# Patient Record
Sex: MALE | Race: ASIAN | NOT HISPANIC OR LATINO | Employment: UNEMPLOYED | URBAN - METROPOLITAN AREA
[De-identification: names, ages, dates, MRNs, and addresses within clinical notes are randomized per-mention and may not be internally consistent; named-entity substitution may affect disease eponyms.]

---

## 2021-01-03 ENCOUNTER — IMMUNIZATIONS (OUTPATIENT)
Dept: FAMILY MEDICINE CLINIC | Facility: HOSPITAL | Age: 26
End: 2021-01-03

## 2021-01-03 DIAGNOSIS — Z23 ENCOUNTER FOR IMMUNIZATION: ICD-10-CM

## 2021-01-03 PROCEDURE — 0011A SARS-COV-2 / COVID-19 MRNA VACCINE (MODERNA) 100 MCG: CPT

## 2021-01-03 PROCEDURE — 91301 SARS-COV-2 / COVID-19 MRNA VACCINE (MODERNA) 100 MCG: CPT

## 2021-01-29 ENCOUNTER — IMMUNIZATIONS (OUTPATIENT)
Dept: FAMILY MEDICINE CLINIC | Facility: HOSPITAL | Age: 26
End: 2021-01-29

## 2021-01-29 DIAGNOSIS — Z23 ENCOUNTER FOR IMMUNIZATION: Primary | ICD-10-CM

## 2021-01-29 PROCEDURE — 91301 SARS-COV-2 / COVID-19 MRNA VACCINE (MODERNA) 100 MCG: CPT

## 2021-01-29 PROCEDURE — 0012A SARS-COV-2 / COVID-19 MRNA VACCINE (MODERNA) 100 MCG: CPT

## 2021-07-15 ENCOUNTER — DOCUMENTATION (OUTPATIENT)
Dept: BEHAVIORAL/MENTAL HEALTH CLINIC | Facility: CLINIC | Age: 26
End: 2021-07-15

## 2021-07-15 ENCOUNTER — TELEPHONE (OUTPATIENT)
Dept: PSYCHIATRY | Facility: CLINIC | Age: 26
End: 2021-07-15

## 2021-07-15 PROCEDURE — NC001 PR NO CHARGE: Performed by: SOCIAL WORKER

## 2021-07-15 NOTE — TELEPHONE ENCOUNTER
Varsha Zamora no showed their appointment for today with Manohar Rodriguez LCSW  This is patient's first no showed appointment  Letter was mailed via regular mail to address on file

## 2021-07-15 NOTE — PROGRESS NOTES
No Call No Show Charge        Fredrick Hunter  no showed on 07/15/21  appointment  Staff will contact the patient to reschedule appointment         Treatment Plan not due at this session

## 2021-07-22 ENCOUNTER — TELEMEDICINE (OUTPATIENT)
Dept: BEHAVIORAL/MENTAL HEALTH CLINIC | Facility: CLINIC | Age: 26
End: 2021-07-22

## 2021-07-22 DIAGNOSIS — F41.1 GAD (GENERALIZED ANXIETY DISORDER): Primary | ICD-10-CM

## 2021-07-22 PROCEDURE — 90834 PSYTX W PT 45 MINUTES: CPT | Performed by: SOCIAL WORKER

## 2021-07-22 NOTE — PSYCH
Virtual Regular Visit    Verification of patient location:    Patient is currently located in the state [facility]  Patient is currently located in a state in which I am licensed      Assessment/Plan:    Problem List Items Addressed This Visit        Other    TODD (generalized anxiety disorder) - Primary          Goals addressed in session: Goal 1          Reason for visit is No chief complaint on file  Encounter provider TRI Brown    Provider located at [address] [place] [id number] [phone]      Recent Visits  Date Type Provider Dept   07/15/21 Telephone Saloni Medina [address]  recent visits within past 7 days and meeting all other requirements  Today's Visits  Date Type Provider Dept   07/22/21 Telemedicine [facility] Therapist Nenita Law   Showing today's visits and meeting all other requirements  Future Appointments  No visits were found meeting these conditions  Showing future appointments within next 150 days and meeting all other requirements       The patient was identified by name and date of birth  Delvin Burk was informed that this is a telemedicine visit and that the [facility] and patient was informed that this is a secure, HIPAA-compliant platform  He agrees to proceed     My office door was closed  No one else was in the room  He acknowledged consent and understanding of privacy and security of the video platform  The patient has agreed to participate and understands they can discontinue the visit at any time  Patient is aware this is a billable service  Subjective  Delvin Burk is a 23 y o  male       Problem List Items Addressed This Visit        Other    TODD (generalized anxiety disorder) - Primary            D: Francisco Mendoza attended a 45 minutes individual session today   Francisco Mendoza entered session with healthy appearance,Normal mood and normal affect  Cody's speech was appropriate quality, quantity and organization of sentences  Patient and clinician discussed his anxiety about school starting in 2 weeks  Caty Saarvia 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  He also talked about some negative emotions from his doctor telling him he can't play full court basketball anymore  Clinician and Caty Saravia processed his emotions around this and identified things he can do to focus on      A: Caty Saravia presented as cooperative throughout the session  Caty Saravia appears to be motivated to change at this time  Caty Saravia has Good insight  Current suicide risk : none    P: Caty Saravia will follow up with Toshia Rivera LCSW  in 2 weeks  Goal(s) 1 was/were addressed in session  Caty Saravia denies SI, SH, HI at this time and can contract for safety  Behavioral Health Treatment Plan [facility]: Diagnosis and Treatment Plan explained to Caty Saravia relates understanding diagnosis and is agreeable to Treatment Plan  Yes         HPI     No past medical history on file  No past surgical history on file  No current outpatient medications on file  No current facility-administered medications for this visit  Not on File    Review of Systems    Video Exam    There were no vitals filed for this visit  Physical Exam     I spent 45 minutes directly with the patient during this visit    VIRTUAL VISIT DISCLAIMER    Jo-Ann Monaco verbally agrees to participate in [employer]  Pt is aware that [employer] could be limited without vital signs or the ability to perform a full hands-on physical Danielle Torres understands he or the provider may request at any time to terminate the video visit and request the patient to seek care or treatment in person

## 2021-08-17 ENCOUNTER — TELEMEDICINE (OUTPATIENT)
Dept: BEHAVIORAL/MENTAL HEALTH CLINIC | Facility: CLINIC | Age: 26
End: 2021-08-17

## 2021-08-17 DIAGNOSIS — F41.1 GAD (GENERALIZED ANXIETY DISORDER): Primary | ICD-10-CM

## 2021-08-17 PROCEDURE — 90832 PSYTX W PT 30 MINUTES: CPT | Performed by: SOCIAL WORKER

## 2021-08-18 NOTE — PSYCH
Virtual Regular Visit    Verification of patient location:    Patient is located in the following state in which I hold an active license [place]      Assessment/Plan:    Problem List Items Addressed This Visit        Other    TODD (generalized anxiety disorder) - Primary          Goals addressed in session: Goal 1          Reason for visit is No chief complaint on file  Encounter provider TRI Lopez    Provider located at [address] [place] [address]  [address] [facility] [address] [phone]      Recent Visits  Date Type Provider Dept   08/17/21 Telemedicine Mynor Rees, [address] [facility] Therapist Arvel Ormond   Showing recent visits within past 7 days and meeting all other requirements  Future Appointments  No visits were found meeting these conditions  Showing future appointments within next 150 days and meeting all other requirements       The patient was identified by name and date of birth  Harjinder Teixeira was informed that this is a telemedicine visit and that the visit is being conducted through[employer] and patient was informed that this is a secure, HIPAA-compliant platform  He agrees to proceed     My office door was closed  No one else was in the room  He acknowledged consent and understanding of privacy and security of the video platform  The patient has agreed to participate and understands they can discontinue the visit at any time  Patient is aware this is a billable service  Subjective  Harjinder Teixeira is a 23 y o  male    Problem List Items Addressed This Visit        Other    TODD (generalized anxiety disorder) - Primary            D: Gene Villalba attended a 30 minutes individual session today  Gene Villalba entered session with healthy appearance,Normal mood and normal affect  Cody's speech was appropriate quality, quantity and organization of sentences   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  Clyde Rapp reported some high stress around the STEP exam in the Spring  Clinician and Clyde Rapp discussed what normal levels of anxiety would look like in order to motivate him to study without becoming too overwhelmed and identified ways to make sure he is staying within that range  They talked about past tests and events that have caused anxiety and how he was able to succeed in those events  Clyde Rapp also talked about his general anxiety and reports that he is getting into a routine and managing it by exercising and staying involved with friends  A: Clyde Rapp presented as cooperative throughout the session  Clyde Rapp appears to be motivated to change at this time  Clyde Rapp has Good insight  Current suicide risk : none    P: Clyde Rapp will follow up with Calli Llanos LCSW  in 2 weeks  Goal(s) 1 was/were addressed in session  Clyde Rapp denies SI, SH, HI at this time and can contract for safety  Behavioral Health Treatment Plan [facility]: Diagnosis and Treatment Plan explained to Clyde Rapp relates understanding diagnosis and is agreeable to Treatment Plan  Yes         HPI     No past medical history on file  No past surgical history on file  No current outpatient medications on file  No current facility-administered medications for this visit  Not on File    Review of Systems    Video Exam    There were no vitals filed for this visit  Physical Exam     I spent 30 minutes directly with the patient during this visit    VIRTUAL VISIT DISCLAIMER    Luciano Kay verbally agrees to participate in [employer]  Pt is aware that [employer] could be limited without vital signs or the ability to perform a full hands-on physical Zainab Mckinney understands he or the provider may request at any time to terminate the video visit and request the patient to seek care or treatment in person

## 2021-08-30 ENCOUNTER — TELEMEDICINE (OUTPATIENT)
Dept: BEHAVIORAL/MENTAL HEALTH CLINIC | Facility: CLINIC | Age: 26
End: 2021-08-30

## 2021-08-30 DIAGNOSIS — F41.1 GAD (GENERALIZED ANXIETY DISORDER): Primary | ICD-10-CM

## 2021-08-30 PROCEDURE — 90834 PSYTX W PT 45 MINUTES: CPT | Performed by: SOCIAL WORKER

## 2021-09-01 NOTE — PSYCH
Virtual Regular Visit    Verification of patient location:    Patient is located in the following state in which I hold an active license [place]      Assessment/Plan:    Problem List Items Addressed This Visit        Other    TODD (generalized anxiety disorder) - Primary          Goals addressed in session: Goal 1          Reason for visit is No chief complaint on file  Encounter provider TRI Barrios    Provider located at [address] [place] [address]  [address] [facility] [id number] [phone]      Recent Visits  Date Type Provider Dept   08/30/21 Telemedicine Jenaro Catalan, [address] [facility] Therapist Rere Haskins   Showing recent visits within past 7 days and meeting all other requirements  Future Appointments  No visits were found meeting these conditions  Showing future appointments within next 150 days and meeting all other requirements       The patient was identified by name and date of birth  Fredrick Hunter was informed that this is a telemedicine visit and that the visit is being conducted through[employer] and patient was informed that this is a secure, HIPAA-compliant platform  He agrees to proceed     My office door was closed  No one else was in the room  He acknowledged consent and understanding of privacy and security of the video platform  The patient has agreed to participate and understands they can discontinue the visit at any time  Patient is aware this is a billable service  Subjective  Fredrick Hunter is a 23 y o  male    Problem List Items Addressed This Visit        Other    TODD (generalized anxiety disorder) - Primary            D: Khoa Rousseau attended a 45 minutes individual session today  Khoa Rousseau entered session with healthy appearance,Normal mood and normal affect  Cody's speech was appropriate quality, quantity and organization of sentences  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   He has been managing his time well and reports he is keeping his stress under control  Rory Ness talked about some feelings he had towards his parents and reported that he was unsure of whether he "should or shouldn't" feel a certain way when they visit  Clinician and Rory Ness discussed his childhood relationship with them and how he views them now, and clinician validated Cody's feelings  Rory Ness also talked about his girlfriend and discussed what his expectations out of the relationship are  A: Rory Ness presented as cooperative throughout the session  Rory Ness appears to be motivated to change at this time  Rory Ness has Good insight  Current suicide risk : none    P: Rory Ness will follow up with Daniel Andre LCSW  in 3 weeks  Goal(s) 1 was/were addressed in session  Rory TiagoSHARYN baeza SI, HI at this time and can contract for safety  Behavioral Health Treatment Plan [facility]: Diagnosis and Treatment Plan explained to Rory Ness  Rory Ness relates understanding diagnosis and is agreeable to Treatment Plan  Yes         HPI     No past medical history on file  No past surgical history on file  No current outpatient medications on file  No current facility-administered medications for this visit  Not on File    Review of Systems    Video Exam    There were no vitals filed for this visit  Physical Exam     I spent 45 minutes directly with the patient during this visit    VIRTUAL VISIT DISCLAIMER    Juana Mcleod verbally agrees to participate in [employer]  Pt is aware that [employer] could be limited without vital signs or the ability to perform a full hands-on physical Jennifer Aerllano understands he or the provider may request at any time to terminate the video visit and request the patient to seek care or treatment in person

## 2022-10-06 ENCOUNTER — SOCIAL WORK (OUTPATIENT)
Dept: BEHAVIORAL/MENTAL HEALTH CLINIC | Facility: CLINIC | Age: 27
End: 2022-10-06

## 2022-10-06 DIAGNOSIS — F41.8 ANXIETY WITH OBSESSIONAL FEATURES: Primary | ICD-10-CM

## 2022-10-06 NOTE — PSYCH
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 has been under a lot of stress, dealing with anxiety  He is struggling managing his anxiety and over thinks certain things  He doesn't struggle with this generally, but gravitates towards certain thoughts and holds onto them until he gets an answer  He broke up with his girlfriend in the beginning of June of 3 5 years due to different views in life  This was his first relationship  He currently is pursing a new relationship and feels good when he's with her, but recognizes that when he's not his thoughts race and it creates severe anxiety  He identified that he doesn't always view himself in a positive way  He often thinks negatively about himself  He disclosed towards the end of the session that when he was 4years old he broke a bone and they found out he had bone cancer  They were able to remove it and he's been cancer free since then  Denies SI, HI, SIB, SA  Previous Psychiatric/psychological treatment/year: 2020 [facility] for a few sessions, and when he was 18 he saw a counselor  Current Psychiatrist/Therapist: Alan Llanos [facility]   Outpatient and/or Partial and Other Community Resources Used (CTT, ICM, VNA): n/a      Problem Assessment:     SOCIAL/VOCATION:  Family Constellation (include parents, relationship with each and pertinent Psych/Medical History):     No family history on file      Mother: close relationship   Spouse: n/a  Father: close relationship  Sibling: Older sister 34years old  Jennifer Coats relates best to CHANEL  REMA Vanessa friends  he lives with roomates  he does not live alone  Domestic Violence: No past history of domestic violence and There is no history of child abuse    Additional Comments related to family/relationships/peer support: Anabaptism friends, sister, and cousin  School or Work History (strengths/limitations/needs): 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  No    FUNCTIONAL STATUS: There has been a recent change in Jennifer Coats ability to do the following: n/a    Level of Assistance Needed/By Whom?: n/a    Jennifer Coats learns best by  demonstration    SUBSTANCE ABUSE ASSESSMENT: no substance abuse    Substance/Route/Age/Amount/Frequency/Last Use: n/a    DETOX HISTORY: n/a    Previous detox/rehab treatment: n/a    HEALTH ASSESSMENT: no referral to PCP needed    LEGAL: No Mental Health Advance Directive or Power of  on file    Prenatal History: N/A    Delivery History: N/A    Developmental Milestones: N/A  Temperament as an infant was normal     Temperament as a toddler was normal   Temperament at school age was normal   Temperament as a teenager was normal     Risk Assessment:   The following ratings are based on my N/A    Risk of Harm to Self:   Demographic risk factors include n/a  Historical Risk Factors include n/a  Recent Specific Risk Factors include n/a  Additional Factors for a Child or Adolescent n/a    Risk of Harm to Others:   Demographic Risk Factors include male and unemployed  Historical Risk Factors include n/a  Recent Specific Risk Factors include n/a    Access to Weapons:   Berenice Cr has access to the following weapons: n/a  The following steps have been taken to ensure weapons are properly secured: n/a    Based on the above information, the client presents the following risk of harm to self or others:  low    The following interventions are recommended:   no intervention changes    Notes regarding this Risk Assessment: no risk of SI, SIB, HI, SA           Review Of Systems:     Mood Normal   Behavior Normal    Thought Content Normal   General Normal    Personality Normal   Other Psych Symptoms Normal   Constitutional Normal   ENT Normal   Cardiovascular Normal    Respiratory Normal    Gastrointestinal Normal   Genitourinary Normal    Musculoskeletal Negative   Integumentary Normal    Neurological Normal    Endocrine Normal          Mental status:  Appearance calm and cooperative  and good eye contact    Mood mood appropriate   Affect affect appropriate    Speech a normal rate   Thought Processes normal thought processes   Hallucinations no hallucinations present    Thought Content no delusions   Abnormal Thoughts no suicidal thoughts  and no homicidal thoughts    Orientation  oriented to person and place and time   Remote Memory short term memory intact and long term memory intact   Attention Span concentration intact   Intellect Appears to be of Average Intelligence   Fund of Knowledge displays adequate knowledge of current events and adequate fund of knowledge regarding past history   Insight Insight intact   Judgement judgment was intact   Muscle Strength Normal gait    Language no difficulty naming common objects and no difficulty repeating a phrase    Pain none   Pain Scale 0

## 2022-10-27 ENCOUNTER — SOCIAL WORK (OUTPATIENT)
Dept: BEHAVIORAL/MENTAL HEALTH CLINIC | Facility: CLINIC | Age: 27
End: 2022-10-27

## 2022-10-27 DIAGNOSIS — F41.8 ANXIETY WITH OBSESSIONAL FEATURES: Primary | ICD-10-CM

## 2022-10-27 NOTE — BH TREATMENT PLAN
Gabrielle Kiran  1995       Date of Initial Treatment Plan: 10/27/2022  Date of Current Treatment Plan: 10/27/22    Treatment Plan Number 1    Strengths/Personal Resources for Self Care: exercise, tv, video games, guitar  Diagnosis:   1  Anxiety with obsessional features         Area of Needs: learn coping skills to manage the anxiety and develop an understanding of it  Long Term Goal 1: Akosua Nicholas will increase coping skills and understanding of his anxiety while decreasing his anxious thoughts over the next 6 months  Target Date: 4/24/2023  Completion Date: TBD         Short Term Objectives for Goal 1:    A: Stas will identify triggers that cause an increase in his anxiety   B: Akosua Nicholas will identify thoughts that cause his anxiety to increase   C: Akosua Nicholas will learn thought stopping techniques through CBT   D: This provider will offer psychoeducation      GOAL 1: Modality: Individual 2x per month   Completion Date TBD and The person(s) responsible for carrying out the plan is  Therapist and [address]: Diagnosis and Treatment Plan explained to Nury Arrington relates understanding diagnosis and is agreeable to Treatment Plan  Client Comments : Please share your thoughts, feelings, need and/or experiences regarding your treatment plan: He would like to figure out why he's feeling so anxious and what he can reasonably do about it

## 2022-10-27 NOTE — PSYCH
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 he was having a lot of thoughts surrounding if the doctor liked him, if he was doing a good job  He reported that it was awkward to be able to speak to him at times  Stas shared a little about his relationship and how it's new and that he has lied to his parents about his beliefs  We explored this and how Tuan800 could be experiencing anxiety as he's not being true to himself and having to show a side that isn't him  A: Stas was quieter in session today and presented as anxious at times  He didn't know what to say and reported feeling a little more anxious then usual  He seems to of had a rough beginning of the week and could be perseverating on recent events  He denied SI, SIB, HI  He was oriented x3  P: This provider will continue working on the identified goals  Behavioral Health Treatment Plan [facility]: Diagnosis and Treatment Plan explained to Colletta Barbara relates understanding diagnosis and is agreeable to Treatment Plan   Yes     Visit Time    Visit Start Time: 4:00PM  Visit Stop Time: 4:45PM  Total Visit Duration: 45 minutes

## 2022-11-02 ENCOUNTER — SOCIAL WORK (OUTPATIENT)
Dept: BEHAVIORAL/MENTAL HEALTH CLINIC | Facility: CLINIC | Age: 27
End: 2022-11-02

## 2022-11-02 DIAGNOSIS — F41.1 GAD (GENERALIZED ANXIETY DISORDER): Primary | ICD-10-CM

## 2022-11-02 NOTE — PSYCH
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  He identified how it's been fufilling him in his daily life  He discussed how he's continuing to struggle with self-esteem  He recognizes that he feels good in his academics if he's getting good grades, but hasn't been getting grades in rotations  We discussed ways he can improve his self-esteem through positive self talk and changing the negative thoughts to positive ones  Stas shared that 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

## 2022-11-10 ENCOUNTER — TELEMEDICINE (OUTPATIENT)
Dept: BEHAVIORAL/MENTAL HEALTH CLINIC | Facility: CLINIC | Age: 27
End: 2022-11-10

## 2022-11-10 DIAGNOSIS — F41.1 GAD (GENERALIZED ANXIETY DISORDER): Primary | ICD-10-CM

## 2022-11-10 NOTE — PSYCH
Virtual Regular Visit    Verification of patient location:    Patient is located in the following state in which I hold an active license [place]      Assessment/Plan:    Problem List Items Addressed This Visit        Other    TODD (generalized anxiety disorder) - Primary          Goals addressed in session: Goal 1          Reason for visit is   Chief Complaint   Patient presents with   • Anxiety        Encounter provider Lance Lynn    Provider located at [address] [place] [address] Emely Segundo  [phone]      Recent Visits  No visits were found meeting these conditions  Showing recent visits within past 7 days and meeting all other requirements  Today's Visits  Date Type Provider Dept   11/10/22 Via Sean Chi  today's visits and meeting all other requirements  Future Appointments  No visits were found meeting these conditions  Showing future appointments within next 150 days and meeting all other requirements       The patient was identified by name and date of birth  Mylene Dewey was informed that this is a telemedicine visit and that the visit is being conducted throughthe [employer] platform  He agrees to proceed     My office door was closed  No one else was in the room  He acknowledged consent and understanding of privacy and security of the video platform  The patient has agreed to participate and understands they can discontinue the visit at any time  Patient is aware this is a billable service  Subjective  Mylene Dewey is a 33 y o  male attended a virtual session   D: Jonathan Mata expressed that that he's been dealing with increased anxiety over the past few weeks due to his relationship  He reported that his girlfriend expressed that she feels she likes him more then he likes her   He was unsure how to respond and feels that she wants to say that she loves him, but is fearful he won't say it back  He expressed that he does love her and was thinking about saying it  Andrea Brenton reported that he has said this before, but hasn't felt it  He is feeling this now with her and feeling a little nervous regarding it  Andrea Farris reported that he will get to meet her family next week and is looking forward to this  A: Stas was mood congruent and goal oriented  He switched to virtual this morning due to not feeling well, but was motivated enough to join the session  He seems to be having some increased anxiety surrounding his relationship and is trying to incorporate thought stopping techniques  He feels that he continues to have racing thoughts  He denied SI, SIB, HI    P: This provider will continue working on the identified goals  HPI     No past medical history on file  No past surgical history on file  No current outpatient medications on file  No current facility-administered medications for this visit  Not on File    Review of Systems    Video Exam    There were no vitals filed for this visit      Physical Exam     11/10/22  Start Time: 1600  Stop Time: 1625  Total Visit Time: 25 minutes

## 2022-12-22 ENCOUNTER — TELEMEDICINE (OUTPATIENT)
Dept: BEHAVIORAL/MENTAL HEALTH CLINIC | Facility: CLINIC | Age: 27
End: 2022-12-22

## 2022-12-22 DIAGNOSIS — F41.1 GAD (GENERALIZED ANXIETY DISORDER): Primary | ICD-10-CM

## 2022-12-22 NOTE — PSYCH
Virtual Regular Visit    Verification of patient location:    Patient is located in the following state in which I hold an active license [place]      Assessment/Plan:    Problem List Items Addressed This Visit        Other    TODD (generalized anxiety disorder) - Primary       Goals addressed in session: Goal 1          Reason for visit is   Chief Complaint   Patient presents with   • Anxiety        Encounter provider Danielle Haddad    Provider located at [address] [place] [address] Emely 163  [phone]      Recent Visits  No visits were found meeting these conditions  Showing recent visits within past 7 days and meeting all other requirements  Today's Visits  Date Type Provider Dept   12/22/22 Telemedicine [address] [place] [address] today's visits and meeting all other requirements  Future Appointments  No visits were found meeting these conditions  Showing future appointments within next 150 days and meeting all other requirements       The patient was identified by name and date of birth  Eliz Wong was informed that this is a telemedicine visit and that the visit is being conducted throughthe "[employer]" platform  He agrees to proceed     My office door was closed  No one else was in the room  He acknowledged consent and understanding of privacy and security of the video platform  The patient has agreed to participate and understands they can discontinue the visit at any time  Patient is aware this is a billable service  Subjective  Eliz Wong is a 33 y o  male attended a virtual session   D: Clifford uMllins expressed that things have been going really well for him  He has been with his girlfriend for some time now and they have expressed that they love each other  He feels calmer about the relationship   He has been doing rotations with work and things have been going well there  He feels good overall and recognizes that his anxiety has been decreased  We discussed how to challenge his negative thoughts when they arrise and focus on what he can control vs  What he can't control  A: Stas was mood congruent, appeared happy and content with his progress thus far  Stas denied any anxious symptoms today in session  He denied SI, SIB, HI  He was oriented x3  P: This provider will continue working on the identified goals  HPI     No past medical history on file  No past surgical history on file  No current outpatient medications on file  No current facility-administered medications for this visit  Not on File    Review of Systems    Video Exam    There were no vitals filed for this visit      Physical Exam     Visit Time    12/22/22  Start Time: 7285  Stop Time: 7034  Total Visit Time: 27 minutes

## 2023-01-26 ENCOUNTER — SOCIAL WORK (OUTPATIENT)
Dept: BEHAVIORAL/MENTAL HEALTH CLINIC | Facility: CLINIC | Age: 28
End: 2023-01-26

## 2023-01-26 DIAGNOSIS — F41.1 GAD (GENERALIZED ANXIETY DISORDER): Primary | ICD-10-CM

## 2023-01-26 NOTE — PSYCH
Behavioral Health Psychotherapy Progress Note    Psychotherapy Provided: Individual Psychotherapy     1  TODD (generalized anxiety disorder)            Goals addressed in session: Goal 1     DATA: Jamar Chisholm stated that his anxiety has been manageable these past few months  He has struggled a bit last week when he was in his one rotation  He felt as though he read into the doctor's body language too much as he began to get anxious  He has been able to challenge his thoughts that's causing his anxiety, which has been helpful  Jamar Chisholm stated that he's been struggling a bit with self-esteem when it comes to be intimate with his girlfriend  We processed through this discussing some of his insecurities and her insecurities  He was able to recognize how well the relationship is going and mentioned that he bought a ring for her  He explained his plans for proposing and applying for residency in September  During this session, this clinician used the following therapeutic modalities: Client-centered Therapy and Supportive Psychotherapy    Substance Abuse was not addressed during this session  If the client is diagnosed with a co-occurring substance use disorder, please indicate any changes in the frequency or amount of use: n/a  Stage of change for addressing substance use diagnoses: No substance use/Not applicable    ASSESSMENT:  Cindy Randhawa presents with a Euthymic/ normal mood  his affect is Normal range and intensity, which is congruent, with his mood and the content of the session  The client has made progress on their goals  Cindy Randhawa presents with a none risk of suicide, none risk of self-harm, and none risk of harm to others  For any risk assessment that surpasses a "low" rating, a safety plan must be developed  A safety plan was indicated: no  If yes, describe in detail n/a    PLAN: Between sessions, Cindy Randhawa will continue challenging his negative thoughts   At the next session, the therapist will use Client-centered Therapy and Supportive Psychotherapy to address his anxiety  Behavioral Health Treatment Plan and Discharge Planning: Иван Foley is aware of and agrees to continue to work on their treatment plan  They have identified and are working toward their discharge goals   yes    Visit start and stop times:    01/26/23  Start Time: 1700  Stop Time: 1730  Total Visit Time: 30 minutes

## 2023-05-18 ENCOUNTER — SOCIAL WORK (OUTPATIENT)
Dept: BEHAVIORAL/MENTAL HEALTH CLINIC | Facility: CLINIC | Age: 28
End: 2023-05-18

## 2023-05-18 DIAGNOSIS — F41.1 GAD (GENERALIZED ANXIETY DISORDER): Primary | ICD-10-CM

## 2023-05-18 NOTE — PSYCH
"Behavioral Health Psychotherapy Progress Note    Psychotherapy Provided: Individual Psychotherapy     1  TODD (generalized anxiety disorder)            Goals addressed in session: Goal 1     DATA: Stas and this provider updated her treatment plan and created his crisis plan  Stas shared that he's been feeling stressed out due to the planning of his wedding  He identified that both sides of the family have been interjecting their thoughts  He stated that he feels in the middle at times and wants to be there for his fiance, but finds it difficult as his father is consistently interjecting his thoughts  We discussed how he's coping with this and how he's been able to stand up for himself  Janeth Bird was able to refect upon his anxiety and how he's been able to challenge it at work  He identified that he still struggles at times, but has been able to challenge his negative thoughts more  He's been dealing with imposter syndrome a lot and isn'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  During this session, this clinician used the following therapeutic modalities: Client-centered Therapy, Cognitive Behavioral Therapy and Supportive Psychotherapy    Substance Abuse was not addressed during this session  If the client is diagnosed with a co-occurring substance use disorder, please indicate any changes in the frequency or amount of use: n/a  Stage of change for addressing substance use diagnoses: No substance use/Not applicable    ASSESSMENT:  Guicho Saunders presents with a Euthymic/ normal mood  his affect is Normal range and intensity, which is congruent, with his mood and the content of the session  The client has made progress on their goals  Guicho Saunders presents with a none risk of suicide, none risk of self-harm, and none risk of harm to others  For any risk assessment that surpasses a \"low\" rating, a safety plan must be developed      A safety plan was indicated: no  If yes, describe in detail n/a    PLAN: " Between sessions, Maye Das will identify what makes him a good doctor At the next session, the therapist will use Client-centered Therapy, Cognitive Behavioral Therapy and Supportive Psychotherapy to address his anxiety  Behavioral Health Treatment Plan and Discharge Planning: Maye Das is aware of and agrees to continue to work on their treatment plan  They have identified and are working toward their discharge goals   yes    Visit start and stop times:    05/18/23  Start Time: 1504  Stop Time: 1545  Total Visit Time: 41 minutes

## 2023-05-18 NOTE — BH TREATMENT PLAN
Outpatient Behavioral Health Psychotherapy Treatment Plan    Yun Reyna  1995     Date of Initial Psychotherapy Assessment: 10/6/2022  Date of Current Treatment Plan: 05/18/23  Treatment Plan Target Date:  11/14/2023  Treatment Plan Expiration Date:  11/14/2023    Diagnosis:   1  TODD (generalized anxiety disorder)            Area(s) of Need: learn coping skills to manage anxiety    Long Term Goal 1 (in the client's own words): Sudhir Bean will increase coping skills and understanding of his anxiety while decreasing his anxious thoughts over the next 6 months  Progress: Sudhir Bean has been able to utilize his coping skills more and challenge his thoughts more when he becomes anxious  Stage of Change: Maintenance    Target Date for completion: TBD     Anticipated therapeutic modalities: Solution-Focused Therapy, Mindfulness-Based Therapy, Client-Centered Therapy, Cognitive Behavioral Therapy, Supportive Therapy     People identified to complete this goal: Stas and Therapist      Objective 1: (identify the means of measuring success in meeting the objective): Sudhir Bean will identify triggers that cause an increase in his anxiety            Objective 2: (identify the means of measuring success in meeting the objective): Sudhir Bean will identify thoughts that cause his anxiety to increase      Long Term Goal 2 (in the client's own words): Sudhir Bean wants to have control over his eating habits  Stage of Change: Action    Target Date for completion: TBD     Anticipated therapeutic modalities: Solution-Focused Therapy, Mindfulness-Based Therapy, Client-Centered Therapy, Cognitive Behavioral Therapy, Supportive Therapy     People identified to complete this goal: Stas and Liang      Objective 1: (identify the means of measuring success in meeting the objective): Sudhir Bean wants to learn to be more mindful       Objective 2: (identify the means of measuring success in meeting the objective):  Sudhir Bean will explore his eating habits      I am currently under the care of a [facility] psychiatric provider: yes    [facility] am currently taking psychiatric medications: n/a    I feel that I will be ready for discharge from mental health care when I reach the following (measurable goal/objective): I haven't really thoughts about that  For children and adults who have a legal guardian:   Has there been any change to custody orders and/or guardianship status? NA  If yes, attach updated documentation  I have created my Crisis Plan and have been offered a copy of this plan    [address]: Diagnosis and Treatment Plan explained to [facility] acknowledges an understanding of their diagnosis  Suad Hernandez agrees to this treatment plan      I have been offered a copy of this Treatment Plan  yes

## 2023-05-18 NOTE — BH CRISIS PLAN
Client Name: Guicho Saunders       Client YOB: 1995  : 1995    Treatment Team (include name and contact information):     Psychotherapist: Carrie Hilliard Provider  No primary care provider on file  No primary provider on file  Type of Plan   * Child plans (children 15 yo and younger) must be completed and signed by the child's legal guardian   * Plans for all individuals 15 yo and above must be signed by the client  Plan Type: adolescent/adult (15 and over) Initial      My Personal Strengths are (in the client's own words):  Good listener, trustworthy, fixing things in life     The stressors and triggers that may put me at risk are:  other (describe) school related, if he thinks he's doing poorly, stress of the wedding    Coping skills I can use to keep myself calm and safe: Other (describe) challenging negative thoughts    Coping skills/supports I can use to maintain abstinence from substance use:   n/a    The people that provide me with help and support: (Include name, contact, and how they can help)   Support person #1: Phi    * Phone number: in his phone    * How can they help me? Talk with him       In the past, the following has helped me in times of crisis:    other (describe) school related, if he thinks he's doing poorly, stress of the wedding      If it is an emergency and you need immediate help, call     If there is a possibility of danger to yourself or others, call the following crisis hotline resources:     Adult Crisis Numbers  Suicide Prevention Hotline - Dial   [facility]: Noe [address]: [address]: [address]: [phone]  [address] ([address]): [address] [facility]: [address] [place] [address] Avenue: [address] [place] [address] St: [phone] (daytime)         [phone] (after hours, weekends, holidays)     Child/Adolescent Crisis Numbers   [facility]: Meng Seo 10: [phone]   Tomeka Mendez: [phone]   [address] [place] [address] ([facility]): [phone]    Please note: Some counties do not have a separate number for Child/Adolescent specific crisis  If your county is not listed under Child/Adolescent, please call the adult number for your county     National Talk to Text Line   All Ages - [phone]    In the event your feelings become unmanageable, and you cannot reach your support system, you will call 911 immediately or go to the nearest hospital emergency room

## 2023-06-19 ENCOUNTER — TELEPHONE (OUTPATIENT)
Dept: DERMATOLOGY | Facility: CLINIC | Age: 28
End: 2023-06-19

## 2023-06-19 NOTE — TELEPHONE ENCOUNTER
Pt left  to schedule NP appt  Returned call and advised that we are scheduling out towards the end of the year for new patients  Pt wants a sooner appt and is going to call around

## 2023-07-20 ENCOUNTER — SOCIAL WORK (OUTPATIENT)
Dept: BEHAVIORAL/MENTAL HEALTH CLINIC | Facility: CLINIC | Age: 28
End: 2023-07-20
Payer: COMMERCIAL

## 2023-07-20 DIAGNOSIS — F41.1 GAD (GENERALIZED ANXIETY DISORDER): Primary | ICD-10-CM

## 2023-07-20 PROCEDURE — 90832 PSYTX W PT 30 MINUTES: CPT | Performed by: COUNSELOR

## 2023-07-20 NOTE — PSYCH
Behavioral Health Psychotherapy Progress Note    Psychotherapy Provided: Individual Psychotherapy     1. TODD (generalized anxiety disorder)            Goals addressed in session: Goal 1     DATA: Stas arrived for his session today. He reported that the past few months have been going well with wedding planning and his rotation. He expressed that they wedding planning has slowed down and there hasn't been much stress around that. He did report that his anxiety increased this past week due to thinking about residency applications and not thinking that he's good enough. He was able to reach out to others in the program for support and validation. This provider encouraged him to look internally to challenge himself and his worth. He expressed that this is difficult to do as he doesn't have evidence for himself. 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 During this session, this clinician used the following therapeutic modalities: Client-centered Therapy, Cognitive Behavioral Therapy and Supportive Psychotherapy    Substance Abuse was not addressed during this session. If the client is diagnosed with a co-occurring substance use disorder, please indicate any changes in the frequency or amount of use: n/a. Stage of change for addressing substance use diagnoses: No substance use/Not applicable    ASSESSMENT:  Nasrin Eldridge presents with a Euthymic/ normal mood. his affect is Normal range and intensity, which is congruent, with his mood and the content of the session. The client has made progress on their goals. Nasrin Eldridge presents with a none risk of suicide, none risk of self-harm, and none risk of harm to others. For any risk assessment that surpasses a "low" rating, a safety plan must be developed. A safety plan was indicated: no  If yes, describe in detail n/a    PLAN: Between sessions, Nasrin Eldridge will continue coping and challenging his thoughts.  At the next session, the therapist will use Client-centered Therapy, Cognitive Behavioral Therapy and Supportive Psychotherapy to address his anxiety. Behavioral Health Treatment Plan and Discharge Planning: Jennifer Jose Ramon is aware of and agrees to continue to work on their treatment plan. They have identified and are working toward their discharge goals.  yes    Visit start and stop times:    07/20/23  Start Time: 1605  Stop Time: 9034  Total Visit Time: 30 minutes

## 2023-10-24 ENCOUNTER — TELEPHONE (OUTPATIENT)
Dept: PSYCHIATRY | Facility: CLINIC | Age: 28
End: 2023-10-24

## 2023-11-08 ENCOUNTER — TELEPHONE (OUTPATIENT)
Dept: PSYCHIATRY | Facility: CLINIC | Age: 28
End: 2023-11-08

## 2023-11-08 ENCOUNTER — DOCUMENTATION (OUTPATIENT)
Dept: BEHAVIORAL/MENTAL HEALTH CLINIC | Facility: CLINIC | Age: 28
End: 2023-11-08

## 2023-11-08 DIAGNOSIS — F41.8 ANXIETY WITH OBSESSIONAL FEATURES: ICD-10-CM

## 2023-11-08 DIAGNOSIS — F41.1 GAD (GENERALIZED ANXIETY DISORDER): Primary | ICD-10-CM

## 2023-11-08 NOTE — PROGRESS NOTES
Psychotherapy Discharge Summary    Preferred Name: Josh Booker  YOB: 1995    Admission date to psychotherapy: 10/6/2022    Referred by: [address] [place] [address]    Presenting Problem: Marielos Colin grew up in a loving household with his parents and sister. He denied any abuse and that he lived a "normal" life. He was always hard on himself to get good grades. His parents expected this out of him as well. 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 He is struggling managing his anxiety and over thinks certain things. He doesn't struggle with this generally, but gravitates towards certain thoughts and holds onto them until he gets an answer. He broke up with his girlfriend in the beginning of June of 3.5 years due to different views in life. This was his first relationship. He currently is pursing a new relationship and feels good when he's with her, but recognizes that when he's not his thoughts race and it creates severe anxiety. He identified that he doesn't always view himself in a positive way. He often thinks negatively about himself. He disclosed towards the end of the session that when he was 4years old he broke a bone and they found out he had bone cancer. They were able to remove it and he's been cancer free since then. Denies SI, HI, SIB, SA. Course of treatment included : psychoeducation and individual therapy     Progress/Outcome of Treatment Goals (brief summary of course of treatment) Stas was able to process through his anxiety and learn coping skills to help manage it. We treated his self-esteem and he was able to learn ways to challenge his thought process. Treatment Complications (if any): n/a    Treatment Progress: good    Current [facility] Psychiatric Provider: n/a    Discharge Medications include: n/a    Discharge Date: 11/8/2023    Discharge Diagnosis:   1. TODD (generalized anxiety disorder)        2.  Anxiety with obsessional features            Criteria for Discharge:  intent to discharge letter was sent home and he never responded. Rosalie Ritchie was doing well and progressed in treatment. Aftercare recommendations include (include specific referral names and phone numbers, if appropriate): continue outpatient therapy should symptoms arise.     Prognosis: good

## 2023-11-09 NOTE — TELEPHONE ENCOUNTER
[facility] placed in outgoing mail on 11/09/23.     Article #:  [id number]    Address:  [address] [place] [id number]

## 2024-03-28 ENCOUNTER — APPOINTMENT (OUTPATIENT)
Dept: LAB | Facility: CLINIC | Age: 29
End: 2024-03-28

## 2024-03-28 DIAGNOSIS — Z11.1 TUBERCULOSIS SCREENING: ICD-10-CM

## 2024-03-28 PROCEDURE — 86480 TB TEST CELL IMMUN MEASURE: CPT

## 2024-03-28 PROCEDURE — 36415 COLL VENOUS BLD VENIPUNCTURE: CPT

## 2024-03-29 LAB
GAMMA INTERFERON BACKGROUND BLD IA-ACNC: 0.09 IU/ML
M TB IFN-G BLD-IMP: NEGATIVE
M TB IFN-G CD4+ BCKGRND COR BLD-ACNC: -0.01 IU/ML
M TB IFN-G CD4+ BCKGRND COR BLD-ACNC: 0 IU/ML
MITOGEN IGNF BCKGRD COR BLD-ACNC: 9.91 IU/ML